# Patient Record
Sex: FEMALE | URBAN - METROPOLITAN AREA
[De-identification: names, ages, dates, MRNs, and addresses within clinical notes are randomized per-mention and may not be internally consistent; named-entity substitution may affect disease eponyms.]

---

## 2017-11-30 ENCOUNTER — THERAPY VISIT (OUTPATIENT)
Dept: PHYSICAL THERAPY | Facility: CLINIC | Age: 67
End: 2017-11-30
Payer: MEDICARE

## 2017-11-30 DIAGNOSIS — M54.41 RIGHT-SIDED LOW BACK PAIN WITH RIGHT-SIDED SCIATICA, UNSPECIFIED CHRONICITY: Primary | ICD-10-CM

## 2017-11-30 PROCEDURE — 97161 PT EVAL LOW COMPLEX 20 MIN: CPT | Mod: GP | Performed by: PHYSICAL THERAPIST

## 2017-11-30 PROCEDURE — G8981 BODY POS CURRENT STATUS: HCPCS | Mod: GP | Performed by: PHYSICAL THERAPIST

## 2017-11-30 PROCEDURE — 97110 THERAPEUTIC EXERCISES: CPT | Mod: GP | Performed by: PHYSICAL THERAPIST

## 2017-11-30 PROCEDURE — G8982 BODY POS GOAL STATUS: HCPCS | Mod: GP | Performed by: PHYSICAL THERAPIST

## 2017-11-30 PROCEDURE — 97112 NEUROMUSCULAR REEDUCATION: CPT | Mod: GP | Performed by: PHYSICAL THERAPIST

## 2017-11-30 NOTE — MR AVS SNAPSHOT
After Visit Summary   11/30/2017    Yvonne Parker    MRN: [id number]           Patient Information     Date Of Birth          1950        Visit Information        Provider Department      11/30/2017 12:10 PM Lisa Rapp, PT [facility] Physical [facility]        Today's Diagnoses     Right-sided low back pain with right-sided sciatica, unspecified chronicity    -  1       Follow-ups after your visit        Your next 10 appointments already scheduled     Dec 07, 2017  7:30 AM CST   SUZANNE Spine with Lisa Rapp, PT   [facility] Physical Therapy ([facility])    [address] [place] [address] [address]  [facility] [place] [facility] [address]   [phone]            Dec 14, 2017 10:10 AM CST   SUZANNE Spine with Lisa Rapp PT   [facility] Physical Therapy ([facility])    [address] [place] [address] [address]  [place] [facility] [address]   [phone]              Who to contact     If you have questions or need follow up information about today's clinic visit or your schedule please contact [facility] PHYSICAL [facility] directly at [phone].  Normal or non-critical lab and imaging results will be communicated to you by MyChart, letter or phone within 4 business days after the clinic has received the results. If you do not hear from us within 7 days, please contact the clinic through [employer] or phone. If you have a critical or abnormal lab result, we will notify you by phone as soon as possible.  Submit refill requests through [employer] or call your pharmacy and they will forward the refill request to us. Please allow 3 business days for your refill to be completed.          Additional Information About Your Visit        [employer] Information     [employer] lets you send messages to your doctor, view your test results, renew your prescriptions, schedule appointments and  "more. To sign up, go to [email or web address] . Click on \"Log in\" on the left side of the screen, which will take you to the Welcome page. Then click on \"Sign up Now\" on the right side of the page.     You will be asked to enter the access code listed below, as well as some personal information. Please follow the directions to create your username and password.     Your access code is: [email or web address]  Expires: 2018  1:51 PM     Your access code will  in 90 days. If you need help or a new code, please call your [place] clinic or [phone].        Care EveryWhere ID     This is your Care EveryWhere ID. This could be used by other organizations to access your [place] medical records  [id number]         Blood Pressure from Last 3 Encounters:   No data found for BP    Weight from Last 3 Encounters:   No data found for Wt              We Performed the Following     HC PT EVAL, LOW COMPLEXITY     SUZANNE CERT REPORT     SUZANNE INITIAL EVAL REPORT     NEUROMUSCULAR RE-EDUCATION     THERAPEUTIC EXERCISES        Primary Care Provider Office Phone # Fax #    Natalia Farah [phone]       [facility] [address] [facility] [address]        Equal Access to Services     ABUNDIO CAHVEZ AH: Hadii aad ku hadasho Soomaali, waaxda luqadaha, qaybta kaalmada adeegyada, waxay jerrodin haydangn chino howard. So [facility] [phone].    ATENCIÓN: Si habla español, tiene a klein disposición servicios gratuitos de asistencia lingüística. Llagapito al [phone].    We comply with applicable federal civil rights laws and [place] laws. We do not discriminate on the basis of race, color, national origin, age, disability, sex, sexual orientation, or gender identity.            Thank you!     Thank you for choosing [facility]  for your care. Our goal is always to provide you with excellent care. Hearing back from our patients is one way we can " continue to improve our services. Please take a few minutes to complete the written survey that you may receive in the mail after your visit with us. Thank you!             Your Updated Medication List - Protect others around you: Learn how to safely use, store and throw away your medicines at www.disposemymeds.org.      Notice  As of 11/30/2017  1:51 PM    You have not been prescribed any medications.

## 2017-11-30 NOTE — PROGRESS NOTES
[facility] Initial Evaluation      Subjective:    Patient is a 67 year old female presenting with rehab back hpi.   Yvonne Parker is a 67 year old female with a lumbar condition.      This is a recurrent condition  10/12/17 saw MD for R sciatica that began a little over a month ago for unknown reasons.  Went to urgent care as could hardly walk.  Did have sciatica 20 years ago, and also 20 years ago before that.  Some small episodes in between but relieve quickly on it's own. No pain in low back just from buttock to R ankle..    Patient reports pain:  Lumbar spine right (R leg).  Radiates to:  Gluteals right, thigh right, knee right and lower leg right.  Pain is described as shooting, burning, sharp and stabbing and is constant and reported as 9/10 (constant 4-9/10).  Associated symptoms:  Loss of motion/stiffness, loss of strength and loss of balance (tingling/numbness B thighs for several years and sometimes down to lower legs B- has not changes with this episode.  Stiffness lower back/hip/buttock). Pain is worse in the A.M..  Symptoms are exacerbated by sitting, walking and standing (rolling in bed, inconsistent pain with walking, getting up after sitting, patient losing 2-3 hours of sleep and this is main goal, 6 hour drive to father's has to get out 4-6x, sitting in recliner) and relieved by other and heat (sitting with legs on bolster, moist heat, hot bath with epsom salts).  Since onset symptoms are gradually improving (not as intense as initially).  Special tests:  X-ray (lumbar arthritis).      General health as reported by patient is good (fair to good).  Pertinent medical history includes:  Overweight, osteoarthritis, high blood pressure, stroke, asthma, depression, fibromyalgia, sleep disorder/apnea and menopausal (TIA).  Medical allergies: no.  Other surgeries include:  Orthopedic surgery (B TKA around 4-5 years ago).  Current medications:  Sleep medication, anti-inflammatory,  anti-depressants and high blood pressure medication.  Current occupation is Retired   .        Barriers include:  Other (stairs to enter home and to lower level).    Red flags:  Pain at rest/night.                        Objective:    System         Lumbar/SI Evaluation  ROM:    AROM Lumbar:   Flexion:          Mod loss with pain to R posterior thigh/buttock  Ext:                    Mod loss no pain, just stiffness   Side Bend:        Left:     Right:   Rotation:           Left:     Right:   Side Glide:        Left:  No loss mild mild R buttock pain    Right:  No loss          Lumbar Myotomes:    T12-L3 (Hip Flex):  Left: 4    Right: 4  L2-4 (Quads):  Left:  5    Right:  5  L4 (Ankle DF):  Left:  5-    Right:  5-  L5 (Great Toe Ext): Left: 5-    Right: 5-         Lumbar Dermtomes:        L2 Left:  Normal-light touch     L2 Right:  Normal-light touch  L3 Left:  Normal-light touch     L3 Right:  Normal-light touch  L4 Left:  Normal-light touch       L4 Right:  Hypo-light touch  L5 Left:  Normal-light touch     L5 Right:  Normal-light touch  S1 Left:  Normal-light touch     S1 Right:  Normal-light touch    Lumbar Palpation:    Tenderness present at Left:    Erector Spinae; Piriformis; PSIS; Greater Trochanter (L>R) and Vertebral  Tenderness not present at Left:    Quadratus Lumborum or Gluteus Medius  Tenderness present at Right: Erector Spinae; Piriformis (R>L); PSIS (R>L); Greater Trochanter and Vertebral (L3-S1 focal tenderness)  Tenderness not present at Right:  Quadratus Lumborum or Gluteus Medius                                          Hip Evaluation      Hip Special Testing:   Special tests hip not assessed: testing L side (piriformis and ravin) provokes pain in R buttock.    Left hip negative for the following special tests:  Piriformis; Ravin (testing L side provokes pain in R buttock) or SLR   Right hip positive for the following special tests:  Piriformis (Tenderness with stretching R  piriformis, although R sided pain when stretching L piriformis) and SLR (pain upon descent)Right hip negative for the following special tests:  Ravin Darnell Lumbar Evaluation    Posture:  Sitting: fair  Standing: fair    Lateral Shift: no  Correction of Posture: no effect  Other Observations: JOSETTE NE to central or R LBP provoked from flexion but then provoked L calf pain.  No pain end of treatment after EIL.    Test Movements:  FIS: During: produces  After: no worse    Repeat FIS: During: produces  After: worse    EIS: During: no effect  After: no effect    Repeat EIS: During: increases and peripheralizing  After: no worse      EIL: During: no effect  After: no effect    Repeat EIL: During: no effect  After: no effect  Mechanical Response: IncROM        Conclusion: derangement (potential derangement asymmetrical/unilateral above knee)  Principle of Treatment:  Posture Correction: Educate keep neutral spine, use of lumbar roll, slouch/ over-correct, over-correct and back off 10% to neutral unsupported sitting.  Avoid repetitive bending    Extension: EIL x 10 every 4-6x/day and prn for relief.  MILTON prn    Other: Future core strengthening                                       ROS    Assessment/Plan:      Patient is a 67 year old female with lumbar complaints.    Patient has the following significant findings with corresponding treatment plan.                Diagnosis 1:  R LBP with R sciatica    Pain -  manual therapy, self management, education, directional preference exercise and home program  Decreased ROM/flexibility - manual therapy, therapeutic exercise and home program  Decreased strength - therapeutic exercise, therapeutic activities and home program  Decreased proprioception - neuro re-education, therapeutic activities and home program  Impaired gait - gait training and home program  Decreased function - therapeutic activities and home program  Impaired posture - neuro re-education and home  program    Therapy Evaluation Codes:   1) History comprised of:   Personal factors that impact the plan of care:      Past/current experiences.    Comorbidity factors that impact the plan of care are:      Fibromyalgia, Numbness/tingling, Osteoarthritis, Pain at night/rest and Weakness.     Medications impacting care: Anti-inflammatory.  2) Examination of Body Systems comprised of:   Body structures and functions that impact the plan of care:      Lumbar spine and R leg.   Activity limitations that impact the plan of care are:      Bending, Driving, Sitting, Stairs, Standing, Walking, Sleeping and Laying down.  3) Clinical presentation characteristics are:   Evolving/Changing.  4) Decision-Making    Low complexity using standardized patient assessment instrument and/or measureable assessment of functional outcome.  Cumulative Therapy Evaluation is: Low complexity.    Previous and current functional limitations:  (See Goal Flow Sheet for this information)    Short term and Long term goals: (See Goal Flow Sheet for this information)     Communication ability:  Patient appears to be able to clearly communicate and understand verbal and written communication and follow directions correctly.  Treatment Explanation - The following has been discussed with the patient:   RX ordered/plan of care  Anticipated outcomes  Possible risks and side effects  This patient would benefit from PT intervention to resume normal activities.   Rehab potential is good.    Frequency:  1 X week, once daily  Duration:  for 6 weeks  Discharge Plan:  Achieve all LTG.  Independent in home treatment program.  Reach maximal therapeutic benefit.    Please refer to the daily flowsheet for treatment today, total treatment time and time spent performing 1:1 timed codes.

## 2017-11-30 NOTE — LETTER
DEPARTMENT OF HEALTH AND HUMAN SERVICES  [facility] MEDICA[facility] SERVICES    PLAN/UPDATED PLAN OF PROGRESS FOR OUTPATIENT REHABILITATION    PATIENTS NAME:  Yvonne Parker   : 1950  PROVIDER NUMBER:    [id number]    [facility]  [id number]     PROVIDER NAME: [facility] [place] [facility]    MEDICAL RECORD NUMBER: [id number]     START OF CARE DATE:  SOC Date: 17   TYPE:  PT    PRIMARY/TREATMENT DIAGNOSIS: (Pertinent Medical Diagnosis)  Right-sided low back pain with right-sided sciatica, unspecified chronicity    VISITS FROM START OF CARE:  Rxs Used: 1     [place] [facility] Initial Evaluation    Subjective:  Patient is a 67 year old female presenting with rehab back hpi.   Yvonne Parker is a 67 year old female with a lumbar condition.  This is a recurrent condition  Saw MD 10/12/17 for R sciatica that began a little over a month ago for unknown reasons.  Went to urgent care as could hardly walk.  Did have sciatica 20 years ago, and also 20 years ago before that.  Some small episodes in between but relieve quickly on it's own. No pain in low back just from buttock to R ankle.    Patient reports pain:  Lumbar spine right (R leg).  Radiates to:  Gluteals right, thigh right, knee right and lower leg right.  Pain is described as shooting, burning, sharp and stabbing and is constant and reported as 9/10 (constant 4-9/10).  Associated symptoms:  Loss of motion/stiffness, loss of strength and loss of balance (tingling/numbness B thighs for several years and sometimes down to lower legs B- has not changes with this episode.  Stiffness lower back/hip/buttock). Pain is worse in the A.M..  Symptoms are exacerbated by sitting, walking and standing (rolling in bed, inconsistent pain with walking, getting up after sitting, patient losing 2-3 hours of sleep and this is main goal, 6 hour drive to father's has to get out 4-6x, sitting in recliner) and relieved by other  and heat (sitting with legs on bolster, moist heat, hot bath with epsom salts).  Since onset symptoms are gradually improving (not as intense as initially).  Special tests:  X-ray (lumbar arthritis).  General health as reported by patient is good (fair to good).  Pertinent medical history includes:  Overweight, osteoarthritis, high blood pressure, stroke, asthma, depression, fibromyalgia, sleep disorder/apnea and menopausal (TIA).  Medical allergies: no.  Other surgeries include:  Orthopedic surgery (B TKA around 4-5 years ago).  Current medications:  Sleep medication, anti-inflammatory, anti-depressants and high blood pressure medication.  Current occupation is Retired .      Barriers include:  Other (stairs to enter home and to lower level).  Red flags:  Pain at rest/night.                  Objective:      PATIENTS NAME:  Yvonne Parker   : 1950      Lumbar/SI Evaluation  ROM:    AROM Lumbar:   Flexion:          Mod loss with pain to R posterior thigh/buttock  Ext:                    Mod loss no pain, just stiffness   Side Bend:        Left:     Right:   Rotation:           Left:     Right:   Side Glide:        Left:  No loss mild mild R buttock pain    Right:  No loss    Lumbar Myotomes:    T12-L3 (Hip Flex):  Left: 4    Right: 4  L2-4 (Quads):  Left:  5    Right:  5  L4 (Ankle DF):  Left:  5-    Right:  5-  L5 (Great Toe Ext): Left: 5-    Right: 5-   Lumbar Dermtomes:    L2 Left:  Normal-light touch     L2 Right:  Normal-light touch  L3 Left:  Normal-light touch     L3 Right:  Normal-light touch  L4 Left:  Normal-light touch  L4 Right:  Hypo-light touch  L5 Left:  Normal-light touch     L5 Right:  Normal-light touch  S1 Left:  Normal-light touch     S1 Right:  Normal-light touch  Lumbar Palpation:    Tenderness present at Left:    Erector Spinae; Piriformis; PSIS; Greater Trochanter (L>R) and Vertebral  Tenderness not present at Left:    Quadratus Lumborum or Gluteus Medius  Tenderness  present at Right: Erector Spinae; Piriformis (R>L); PSIS (R>L); Greater Trochanter and Vertebral (L3-S1 focal tenderness)  Tenderness not present at Right:  Quadratus Lumborum or Gluteus Medius       Hip Evaluation  Hip Special Testing:   Special tests hip not assessed: testing L side (piriformis and ravin) provokes pain in R buttock.  Left hip negative for the following special tests:  Piriformis; Ravin (testing L side provokes pain in R buttock) or SLR  Right hip positive for the following special tests:  Piriformis (Tenderness with stretching R piriformis, although R sided pain when stretching L piriformis) and SLR (pain upon descent)Right hip negative for the following special tests:  Ravin      Carie Lumbar Evaluation  Posture:  Sitting: fair  Standing: fair  Lateral Shift: no  Correction of Posture: no effect  Other Observations: JOSETTE NE to central or R LBP provoked from flexion but then provoked L calf pain.  No pain end of treatment after EIL.  Test Movements:  FIS: During: produces  After: no worse    Repeat FIS: During: produces  After: worse    EIS: During: no effect  After: no effect    Repeat EIS: During: increases and peripheralizing  After: no worse    EIL: During: no effect  After: no effect    PATIENTS NAME:  Yvonne Parker   : 1950    Repeat EIL: During: no effect  After: no effect  Mechanical Response: IncROM  Conclusion: derangement (potential derangement asymmetrical/unilateral above knee)  Principle of Treatment:  Posture Correction: Educate keep neutral spine, use of lumbar roll, slouch/ over-correct, over-correct and back off 10% to neutral unsupported sitting.  Avoid repetitive bending  Extension: EIL x 10 every 4-6x/day and prn for relief.  MILTON prn  Other: Future core strengthening     Assessment/Plan:    Patient is a 67 year old female with lumbar complaints.    Patient has the following significant findings with corresponding treatment plan.                Diagnosis 1:  R LBP  with R sciatica  Pain -  manual therapy, self management, education, directional preference exercise and home program  Decreased ROM/flexibility - manual therapy, therapeutic exercise and home program  Decreased strength - therapeutic exercise, therapeutic activities and home program  Decreased proprioception - neuro re-education, therapeutic activities and home program  Impaired gait - gait training and home program  Decreased function - therapeutic activities and home program  Impaired posture - neuro re-education and home program    Therapy Evaluation Codes:   1) History comprised of:   Personal factors that impact the plan of care:      Past/current experiences.    Comorbidity factors that impact the plan of care are:      Fibromyalgia, Numbness/tingling, Osteoarthritis, Pain at night/rest and Weakness.     Medications impacting care: Anti-inflammatory.  2) Examination of Body Systems comprised of:   Body structures and functions that impact the plan of care:      Lumbar spine and R leg.   Activity limitations that impact the plan of care are:      Bending, Driving, Sitting, Stairs, Standing, Walking, Sleeping and Laying down.  3) Clinical presentation characteristics are:   Evolving/Changing.  4) Decision-Making    Low complexity using standardized patient assessment instrument and/or measureable assessment of functional outcome.  Cumulative Therapy Evaluation is: Low complexity.    Previous and current functional limitations:  (See Goal Flow Sheet for this information)    Short term and Long term goals: (See Goal Flow Sheet for this information)     Communication ability:  Patient appears to be able to clearly communicate and understand verbal and written communication and follow directions correctly.  Treatment Explanation - The following has been discussed with the patient:   RX ordered/plan of care  Anticipated outcomes  Possible risks and side effects  PATIENTS NAME:  ParkerYvonne   : 1950    This  "patient would benefit from PT intervention to resume normal activities.   Rehab potential is good.    Frequency:  1 X week, once daily  Duration:  for 6 weeks  Discharge Plan:  Achieve all LTG.  Independent in home treatment program.  Reach maximal therapeutic benefit.      Caregiver Signature/Credentials _____________________________ Date ________       Treating Provider: Lisa Rapp DPT     I have reviewed and certified the need for these services and plan of treatment while under my care.      PHYSICIAN'S SIGNATURE:   _________________________________________  Date___________   Renard Forman MD    Certification period:  Beginning of Cert date period: 11/30/17 to  End of Cert period date: 02/27/18     Functional Level Progress Report: Please see attached \"Goal Flow sheet for Functional level.\"    ____X____ Continue Services or       ________ DC Services                Service dates: From  SOC Date: 11/30/17 date to present                         "

## 2017-12-07 ENCOUNTER — THERAPY VISIT (OUTPATIENT)
Dept: PHYSICAL THERAPY | Facility: CLINIC | Age: 67
End: 2017-12-07
Payer: MEDICARE

## 2017-12-07 DIAGNOSIS — M54.41 RIGHT-SIDED LOW BACK PAIN WITH RIGHT-SIDED SCIATICA, UNSPECIFIED CHRONICITY: ICD-10-CM

## 2017-12-07 PROCEDURE — 97112 NEUROMUSCULAR REEDUCATION: CPT | Mod: GP | Performed by: PHYSICAL THERAPIST

## 2017-12-07 PROCEDURE — 97110 THERAPEUTIC EXERCISES: CPT | Mod: GP | Performed by: PHYSICAL THERAPIST

## 2017-12-07 NOTE — MR AVS SNAPSHOT
"              After Visit Summary   12/7/2017    Yvonne Parker    MRN: [id number]           Patient Information     Date Of Birth          1950        Visit Information        Provider Department      12/7/2017 7:30 AM Lisa Rapp, LUTHER [facility] Therapy        Today's Diagnoses     Right-sided low back pain with right-sided sciatica, unspecified chronicity           Follow-ups after your visit        Your next 10 appointments already scheduled     Dec 14, 2017 10:10 AM [facility] with Lisa Rapp PT   [facility] ([facility]    [address] [address]   [phone]              Who to contact     If you have questions or need follow up information about today's clinic visit or your schedule please contact [facility] directly at [phone].  Normal or non-critical lab and imaging results will be communicated to you by dMetricshart, letter or phone within 4 business days after the clinic has received the results. If you do not hear from us within 7 days, please contact the clinic through dMetricshart or phone. If you have a critical or abnormal lab result, we will notify you by phone as soon as possible.  Submit refill requests through iSTAR or call your pharmacy and they will forward the refill request to us. Please allow 3 business days for your refill to be completed.          Additional Information About Your Visit        [employer] Information     iSTAR lets you send messages to your doctor, view your test results, renew your prescriptions, schedule appointments and more. To sign up, go to [email or web address] . Click on \"Log in\" on the left side of the screen, which will take you to the Welcome page. Then click on \"Sign up Now\" on the right side of the page.     You will be asked to enter the access " code listed below, as well as some personal information. Please follow the directions to create your username and password.     Your access code is: [email or web address]  Expires: 2018  1:51 PM     Your access code will  in 90 days. If you need help or a new code, please call your [place] clinic or [phone].        Care EveryWhere ID     This is your Care EveryWhere ID. This could be used by other organizations to access your [place] medical records  [id number]         Blood Pressure from Last 3 Encounters:   No data found for BP    Weight from Last 3 Encounters:   No data found for Wt              We Performed the Following     NEUROMUSCULAR RE-EDUCATION     THERAPEUTIC EXERCISES        Primary Care Provider Office Phone # Fax #    Natalia Farah [phone]       [facility] [address] [facility] [address]        Equal Access to Services     [facility]: Hadii aad ku hadasho Soomaali, waaxda luqadaha, qaybta kaalmada adeegyada, waxay jerrodin haydangn chino tyson . So [facility] [phone].    ATENCIÓN: Si habla español, tiene a klein disposición servicios gratuitos de asistencia lingüística. Llame al [phone].    We comply with applicable federal civil rights laws and [place] laws. We do not discriminate on the basis of race, color, national origin, age, disability, sex, sexual orientation, or gender identity.            Thank you!     Thank you for choosing [facility] PHYSICAL THERAP[facility]  for your care. Our goal is always to provide you with excellent care. Hearing back from our patients is one way we can continue to improve our services. Please take a few minutes to complete the written survey that you may receive in the mail after your visit with us. Thank you!             Your Updated Medication List - Protect others around you: Learn how to safely use, store and throw away your medicines at www.disposemymeds.org.       Notice  As of 12/7/2017  1:54 PM    You have not been prescribed any medications.

## 2018-05-14 PROBLEM — M54.41 RIGHT-SIDED LOW BACK PAIN WITH RIGHT-SIDED SCIATICA: Status: RESOLVED | Noted: 2017-11-30 | Resolved: 2018-05-14

## 2018-05-14 NOTE — PROGRESS NOTES
Subjective:  HPI                    Objective:  System    Physical Exam    General     ROS    Assessment/Plan:    DISCHARGE REPORT    Progress reporting period is from 11/30/17 to 12/7/17, 2 visits.       SUBJECTIVE  Subjective changes noted by patient:  Pain is no longer constant.  R buttock pain has gotten worse but no longer as much in the leg.  Continued numbness in foot.  schlorotherapy on veins on R leg so has to bend down while sitting on floor to put compression sleeve on leg.  One thing that has really helped is to slide hips back in seat and sitting upright.   Able to do MILTON and attempts EIL.  Has been afraid of EIL in the past.  Pain always the worst evening and during night.  Has daughter with CP and about 1x/month comes home and has to do full lift for transfers and this always irritates the back.    Current Pain level: 4/10 (3-4/10).      Initial Pain level: 9/10 (constant 4-9/10).   Changes in function:  None  Adverse reaction to treatment or activity: None    OBJECTIVE  Changes noted in objective findings:  Patient has failed to return to therapy so current objective findings are unknown. and The objective findings below are from DOS 12/7/17.  LROM flexion min loss reaches just below knees, ext max loss no pain, L side glide no loss, R sideglide no loss pain to R buttock.  EIL no pain during/after, upon standing feels much better.     ASSESSMENT/PLAN  Updated problem list and treatment plan: Diagnosis 1:  R LBP with R sciatica   Assessment of Progress: The patient's condition is improving.  The patient's condition has potential to improve.  Patient has not returned to therapy.  Current status is unknown and discharge G code cannot be reported.    Recommendations:  This patient is ready to be discharged from therapy and continue their home treatment program as did not return to therapy after 12/7/17.    Please refer to the daily flowsheet for treatment today, total treatment time and time spent  performing 1:1 timed codes.